# Patient Record
Sex: FEMALE | Race: WHITE | NOT HISPANIC OR LATINO | RURAL
[De-identification: names, ages, dates, MRNs, and addresses within clinical notes are randomized per-mention and may not be internally consistent; named-entity substitution may affect disease eponyms.]

---

## 2024-03-26 ENCOUNTER — OV NP (OUTPATIENT)
Dept: RURAL CLINIC 2 | Facility: CLINIC | Age: 64
End: 2024-03-26
Payer: COMMERCIAL

## 2024-03-26 ENCOUNTER — LAB (OUTPATIENT)
Dept: RURAL CLINIC 2 | Facility: CLINIC | Age: 64
End: 2024-03-26

## 2024-03-26 VITALS
HEIGHT: 63 IN | BODY MASS INDEX: 22.15 KG/M2 | DIASTOLIC BLOOD PRESSURE: 79 MMHG | SYSTOLIC BLOOD PRESSURE: 151 MMHG | WEIGHT: 125 LBS | TEMPERATURE: 97.3 F | HEART RATE: 74 BPM

## 2024-03-26 DIAGNOSIS — Z86.010 HISTORY OF COLON POLYPS: ICD-10-CM

## 2024-03-26 DIAGNOSIS — R10.32 LEFT LOWER QUADRANT PAIN: ICD-10-CM

## 2024-03-26 DIAGNOSIS — R11.0 NAUSEA: ICD-10-CM

## 2024-03-26 PROBLEM — 301716002: Status: ACTIVE | Noted: 2024-03-26

## 2024-03-26 PROBLEM — 428283002: Status: ACTIVE | Noted: 2024-03-26

## 2024-03-26 PROCEDURE — 99244 OFF/OP CNSLTJ NEW/EST MOD 40: CPT | Performed by: NURSE PRACTITIONER

## 2024-03-26 RX ORDER — SODIUM, POTASSIUM,MAG SULFATES 17.5-3.13G
354 ML SOLUTION, RECONSTITUTED, ORAL ORAL 2
Qty: 1 | Refills: 0 | OUTPATIENT
Start: 2024-03-26 | End: 2024-03-27

## 2024-03-26 NOTE — HPI-ZZZTODAY'S VISIT
The patient is a 63-year-old female who presents on referral from Dr. Davy Esparza for left lower quadrant abdominal pain.  A copy of this document to be sent to the referring provider.  She reports persistent left lower quadrant pain ongoing for  almost a year and has worsened to the point that it is affecting her activities of daily living.  She was initially evaluated by ultrasound of her left ovary with normal findings.  She then underwent a CT scan of the abdomen and pelvis a few weeks ago and negative for acute abdominal or pelvic abnormalities.  There was findings of moderate stool burden however she denies issues with constipation.  Currently she reports frequent left lower quadrant abdominal pain, moderate in severity, sometimes sharp/burning in quality and radiates to the left hip region.  When the pain is more severe she will experience nausea without vomiting.  She denies a change in bowels or rectal bleeding.  Her previous colonoscopy was approximately 4 years ago and benign colon polyps were removed per her own words.

## 2024-03-27 PROBLEM — 422587007: Status: ACTIVE | Noted: 2024-03-27

## 2024-05-22 ENCOUNTER — CLAIMS CREATED FROM THE CLAIM WINDOW (OUTPATIENT)
Dept: RURAL MEDICAL CENTER 4 | Facility: MEDICAL CENTER | Age: 64
End: 2024-05-22

## 2024-05-22 ENCOUNTER — OFFICE VISIT (OUTPATIENT)
Dept: RURAL MEDICAL CENTER 4 | Facility: MEDICAL CENTER | Age: 64
End: 2024-05-22

## 2024-05-22 RX ORDER — SOD SULF/POT CHLORIDE/MAG SULF 1.479 G
12 TABLETS THE FIRST DOSE THE EVENING BEFORE AND SECOND DOSE THE MORNING OF COLONOSCOPY TABLET ORAL TWICE A DAY
Qty: 24 | OUTPATIENT
Start: 2024-05-21 | End: 2024-05-22